# Patient Record
Sex: MALE | Race: WHITE | NOT HISPANIC OR LATINO | URBAN - METROPOLITAN AREA
[De-identification: names, ages, dates, MRNs, and addresses within clinical notes are randomized per-mention and may not be internally consistent; named-entity substitution may affect disease eponyms.]

---

## 2023-09-20 LAB — PROSTATE SPECIFIC AG (NG/ML) IN SER/PLAS: 0.41 NG/ML (ref 0–4)

## 2023-09-27 ENCOUNTER — DOCUMENTATION (OUTPATIENT)
Dept: CARE COORDINATION | Facility: CLINIC | Age: 63
End: 2023-09-27

## 2023-09-28 ENCOUNTER — DOCUMENTATION (OUTPATIENT)
Dept: CARE COORDINATION | Facility: CLINIC | Age: 63
End: 2023-09-28

## 2023-09-28 NOTE — PROGRESS NOTES
Outreach call to patient to support a smooth transition of care from recent admission.  Left voicemail message for patient with my contact information.    JULIO CESAR CarvajalN, RN

## 2023-12-13 ENCOUNTER — PATIENT OUTREACH (OUTPATIENT)
Dept: CARE COORDINATION | Facility: CLINIC | Age: 63
End: 2023-12-13

## 2023-12-13 NOTE — PROGRESS NOTES
Outreach call to patient to support a smooth transition of care from recent admission.  Unable to leave a voicemail message for patient with my contact information, Phone only rings.    JULIO CESAR CarvajalN, RN